# Patient Record
Sex: FEMALE | URBAN - METROPOLITAN AREA
[De-identification: names, ages, dates, MRNs, and addresses within clinical notes are randomized per-mention and may not be internally consistent; named-entity substitution may affect disease eponyms.]

---

## 2024-06-27 ENCOUNTER — OFFICE VISIT (OUTPATIENT)
Dept: URBAN - METROPOLITAN AREA CLINIC 94 | Facility: CLINIC | Age: 64
End: 2024-06-27

## 2024-06-28 ENCOUNTER — OFFICE VISIT (OUTPATIENT)
Dept: URBAN - METROPOLITAN AREA CLINIC 94 | Facility: CLINIC | Age: 64
End: 2024-06-28
Payer: MEDICARE

## 2024-06-28 ENCOUNTER — DASHBOARD ENCOUNTERS (OUTPATIENT)
Age: 64
End: 2024-06-28

## 2024-06-28 VITALS
HEIGHT: 62 IN | TEMPERATURE: 98.2 F | HEART RATE: 70 BPM | SYSTOLIC BLOOD PRESSURE: 117 MMHG | BODY MASS INDEX: 30.62 KG/M2 | DIASTOLIC BLOOD PRESSURE: 69 MMHG | OXYGEN SATURATION: 94 % | WEIGHT: 166.4 LBS

## 2024-06-28 DIAGNOSIS — R79.89 ELEVATED LFTS: ICD-10-CM

## 2024-06-28 DIAGNOSIS — Z85.028 HISTORY OF GASTRIC CANCER: ICD-10-CM

## 2024-06-28 DIAGNOSIS — R10.13 EPIGASTRIC PAIN: ICD-10-CM

## 2024-06-28 PROBLEM — 473061005: Status: ACTIVE | Noted: 2024-06-28

## 2024-06-28 PROCEDURE — 99204 OFFICE O/P NEW MOD 45 MIN: CPT

## 2024-06-28 RX ORDER — OMEPRAZOLE 40 MG/1
1 CAPSULE 30 MINUTES BEFORE MORNING MEAL CAPSULE, DELAYED RELEASE ORAL ONCE A DAY
Qty: 30 | Refills: 3 | OUTPATIENT
Start: 2024-06-28

## 2024-06-28 RX ORDER — ATORVASTATIN CALCIUM 20 MG/1
1 TABLET TABLET, FILM COATED ORAL ONCE A DAY
Status: ACTIVE | COMMUNITY

## 2024-06-28 RX ORDER — SERTRALINE 50 MG/1
1 TABLET TABLET, FILM COATED ORAL ONCE A DAY
Status: ACTIVE | COMMUNITY

## 2024-06-28 RX ORDER — AMLODIPINE BESYLATE 10 MG/1
1 TABLET TABLET ORAL ONCE A DAY
Status: ACTIVE | COMMUNITY

## 2024-06-28 NOTE — HPI-TODAY'S VISIT:
65 y/o F hx of gastric cancer? presents for NP eval of epigastric pain.  Pt admits to 3 days hx of sudden epigastric pain. She cannot ID any trigger and occurs regardless of PO intake. Pt states she has a hx of gastric cancer with surgery with [facility] in [place] and no recent EGD. Per record review, EGD done in 05/2023 with gastric ulcer, gastritis no other abnl. Denies heartburn, regurg, n/v, melena. Denies generalized abdominal pain, changes in bowel habits. No constipation, diarrhea. Denies rectal bleeding, dark stool, unintentional wt loss.   No heart, lung disease. No blood thinners. No DM meds.   05/2023 CLS: 5-7 mm ascending x 3 TA. 7 mm transverse TA. 10 mm distal rectal TV. Left diverticulosis . Repeat 3 y.  05/2023 EGD: Normal esophagus and duodenum. Chronic gastritis with ulcer.  No heart, lung disease. No blood thinners. No DM meds.

## 2024-07-25 ENCOUNTER — CLAIMS CREATED FROM THE CLAIM WINDOW (OUTPATIENT)
Dept: URBAN - METROPOLITAN AREA CLINIC 4 | Facility: CLINIC | Age: 64
End: 2024-07-25
Payer: COMMERCIAL

## 2024-07-25 ENCOUNTER — OFFICE VISIT (OUTPATIENT)
Dept: URBAN - METROPOLITAN AREA SURGERY CENTER 17 | Facility: SURGERY CENTER | Age: 64
End: 2024-07-25

## 2024-07-25 ENCOUNTER — CLAIMS CREATED FROM THE CLAIM WINDOW (OUTPATIENT)
Dept: URBAN - METROPOLITAN AREA SURGERY CENTER 17 | Facility: SURGERY CENTER | Age: 64
End: 2024-07-25
Payer: COMMERCIAL

## 2024-07-25 DIAGNOSIS — K21.9 GERD: ICD-10-CM

## 2024-07-25 DIAGNOSIS — K31.A0 GASTRIC INTESTINAL METAPLASIA, UNSPECIFIED: ICD-10-CM

## 2024-07-25 DIAGNOSIS — K31.A0 GASTRIC INTESTINAL METAPLASIA UNSPECIFIED: ICD-10-CM

## 2024-07-25 DIAGNOSIS — K21.9 GASTRO-ESOPHAGEAL REFLUX DISEASE WITHOUT ESOPHAGITIS: ICD-10-CM

## 2024-07-25 PROCEDURE — 00731 ANES UPR GI NDSC PX NOS: CPT | Performed by: NURSE ANESTHETIST, CERTIFIED REGISTERED

## 2024-07-25 PROCEDURE — 88342 IMHCHEM/IMCYTCHM 1ST ANTB: CPT | Performed by: PATHOLOGY

## 2024-07-25 PROCEDURE — 88305 TISSUE EXAM BY PATHOLOGIST: CPT | Performed by: PATHOLOGY

## 2024-07-25 RX ORDER — ATORVASTATIN CALCIUM 20 MG/1
1 TABLET TABLET, FILM COATED ORAL ONCE A DAY
Status: ACTIVE | COMMUNITY

## 2024-07-25 RX ORDER — SERTRALINE 50 MG/1
1 TABLET TABLET, FILM COATED ORAL ONCE A DAY
Status: ACTIVE | COMMUNITY

## 2024-07-25 RX ORDER — AMLODIPINE BESYLATE 10 MG/1
1 TABLET TABLET ORAL ONCE A DAY
Status: ACTIVE | COMMUNITY

## 2024-07-25 RX ORDER — OMEPRAZOLE 40 MG/1
1 CAPSULE 30 MINUTES BEFORE MORNING MEAL CAPSULE, DELAYED RELEASE ORAL ONCE A DAY
Qty: 30 | Refills: 3 | Status: ACTIVE | COMMUNITY
Start: 2024-06-28

## 2024-08-15 ENCOUNTER — LAB OUTSIDE AN ENCOUNTER (OUTPATIENT)
Dept: URBAN - METROPOLITAN AREA CLINIC 94 | Facility: CLINIC | Age: 64
End: 2024-08-15

## 2024-08-15 ENCOUNTER — OFFICE VISIT (OUTPATIENT)
Dept: URBAN - METROPOLITAN AREA CLINIC 94 | Facility: CLINIC | Age: 64
End: 2024-08-15
Payer: MEDICARE

## 2024-08-15 VITALS
HEIGHT: 62 IN | SYSTOLIC BLOOD PRESSURE: 128 MMHG | TEMPERATURE: 99.7 F | OXYGEN SATURATION: 95 % | BODY MASS INDEX: 30.18 KG/M2 | HEART RATE: 78 BPM | DIASTOLIC BLOOD PRESSURE: 78 MMHG | WEIGHT: 164 LBS

## 2024-08-15 DIAGNOSIS — K76.0 HEPATIC STEATOSIS: ICD-10-CM

## 2024-08-15 DIAGNOSIS — K29.50 CHRONIC GASTRITIS WITHOUT BLEEDING, UNSPECIFIED GASTRITIS TYPE: ICD-10-CM

## 2024-08-15 PROBLEM — 8493009: Status: ACTIVE | Noted: 2024-08-15

## 2024-08-15 PROBLEM — 197321007: Status: ACTIVE | Noted: 2024-08-15

## 2024-08-15 PROCEDURE — 99214 OFFICE O/P EST MOD 30 MIN: CPT

## 2024-08-15 RX ORDER — FAMOTIDINE 40 MG/1
1 TABLET AT BEDTIME TABLET, FILM COATED ORAL ONCE A DAY
Qty: 90 TABLET | Refills: 1 | OUTPATIENT
Start: 2024-08-15

## 2024-08-15 RX ORDER — ATORVASTATIN CALCIUM 20 MG/1
1 TABLET TABLET, FILM COATED ORAL ONCE A DAY
Status: ACTIVE | COMMUNITY

## 2024-08-15 RX ORDER — SUCRALFATE 1 G/1
1 TABLET ON AN EMPTY STOMACH TABLET ORAL
Qty: 90 | Refills: 1 | OUTPATIENT
Start: 2024-08-15 | End: 2024-10-15

## 2024-08-15 RX ORDER — SERTRALINE 50 MG/1
1 TABLET TABLET, FILM COATED ORAL ONCE A DAY
Status: ACTIVE | COMMUNITY

## 2024-08-15 RX ORDER — OMEPRAZOLE 40 MG/1
1 CAPSULE 30 MINUTES BEFORE MORNING MEAL CAPSULE, DELAYED RELEASE ORAL ONCE A DAY
Qty: 30 | Refills: 3 | Status: ACTIVE | COMMUNITY
Start: 2024-06-28

## 2024-08-15 RX ORDER — AMLODIPINE BESYLATE 10 MG/1
1 TABLET TABLET ORAL ONCE A DAY
Status: ACTIVE | COMMUNITY

## 2024-08-15 NOTE — HPI-TODAY'S VISIT:
63 y/o F hx of gastric cancer? presents for EGD f/u for epigastric pain.  Pt reports epigastric pain has improved somewhat since starting omeprazole. Does report it still occurs and cannot ID particular trigger. Does not assc it with PO intake but admits it will wax/wane t/o the day but is improved compared to prior. Denies reflux, regurg, n/v, dysphagia. Denies generalized abdominal pain, changes in bowel habits. No constipation, diarrhea. Denies rectal bleeding, dark stool, unintentional wt loss.   Does endorse ? hx of gastric cancer. Per record review with [facility] - no documentation but will request full records. Per record review, EGD done in 05/2023 with gastric ulcer, gastritis no other abnl.   10/2023 CT a/p: No acute intra-abdominal process. 1.1 cm polypoid bladder mass concerning for malignant neoplastic disease. Urologic consultation is recommended. 1.5 cm exophytic left renal lesion demonstrating T1 hyperintensity on prior abdominal MRI, likely representing a hemorrhagic cyst, has not slightly changed since prior examination.  Diverticulosis coli. No evidence of acute diverticulitis. Hepatic steatosis.  03/2024 US: Hepatic steatosis.  05/2024 CT a/p: Mild fatty liver. No other abnl.   05/2023 CLS: 5-7 mm ascending x 3 TA. 7 mm transverse TA. 10 mm distal rectal TV. Left diverticulosis . Repeat 3 y.  05/2023 EGD: Normal esophagus and duodenum. Chronic gastritis with ulcer. 07/2024 EGD: Chronic inactive gastritis with changes suggestive of treated H pylori gastritis. Neg dysplasia, malignancy. Reflux esophagitis. Neg EoE, BE.

## 2024-08-20 LAB
ALBUMIN/GLOBULIN RATIO: 1.5
ALBUMIN: 4.6
ALKALINE PHOSPHATASE: 125
ALT (SGPT): 68
AST (SGOT): 60
BILIRUBIN, DIRECT: 0.1
BILIRUBIN, INDIRECT: 0.5
BILIRUBIN, TOTAL: 0.6
GLOBULIN: 3
H PYLORI BREATH TEST: DETECTED
PROTEIN, TOTAL: 7.6

## 2024-08-21 ENCOUNTER — TELEPHONE ENCOUNTER (OUTPATIENT)
Dept: URBAN - METROPOLITAN AREA CLINIC 94 | Facility: CLINIC | Age: 64
End: 2024-08-21

## 2024-08-21 ENCOUNTER — LAB OUTSIDE AN ENCOUNTER (OUTPATIENT)
Dept: URBAN - METROPOLITAN AREA CLINIC 94 | Facility: CLINIC | Age: 64
End: 2024-08-21

## 2024-08-21 RX ORDER — CLARITHROMYCIN 500 MG/1
1 TABLET TABLET, FILM COATED ORAL
Qty: 28 TABLET | OUTPATIENT
Start: 2024-08-21 | End: 2024-09-04

## 2024-08-21 RX ORDER — OMEPRAZOLE 40 MG/1
1 CAPSULE 30 MINUTES BEFORE MEAL CAPSULE, DELAYED RELEASE ORAL TWICE DAILY
Qty: 28 | Refills: 0 | OUTPATIENT
Start: 2024-08-21

## 2024-08-21 RX ORDER — METRONIDAZOLE 500 MG/1
1 TABLET TABLET ORAL THREE TIMES A DAY
Qty: 42 TABLET | OUTPATIENT
Start: 2024-08-21 | End: 2024-09-04

## 2024-09-27 ENCOUNTER — OFFICE VISIT (OUTPATIENT)
Dept: URBAN - METROPOLITAN AREA CLINIC 94 | Facility: CLINIC | Age: 64
End: 2024-09-27
Payer: MEDICARE

## 2024-09-27 VITALS
TEMPERATURE: 98.2 F | DIASTOLIC BLOOD PRESSURE: 69 MMHG | WEIGHT: 164.6 LBS | OXYGEN SATURATION: 90 % | SYSTOLIC BLOOD PRESSURE: 122 MMHG | HEIGHT: 62 IN | BODY MASS INDEX: 30.29 KG/M2 | HEART RATE: 82 BPM

## 2024-09-27 DIAGNOSIS — A04.8 H. PYLORI INFECTION: ICD-10-CM

## 2024-09-27 DIAGNOSIS — K76.0 HEPATIC STEATOSIS: ICD-10-CM

## 2024-09-27 PROCEDURE — 99214 OFFICE O/P EST MOD 30 MIN: CPT

## 2024-09-27 RX ORDER — SUCRALFATE 1 G/1
1 TABLET ON AN EMPTY STOMACH TABLET ORAL
Qty: 90 | Refills: 1 | Status: ACTIVE | COMMUNITY
Start: 2024-08-15 | End: 2024-10-15

## 2024-09-27 RX ORDER — SERTRALINE 50 MG/1
1 TABLET TABLET, FILM COATED ORAL ONCE A DAY
Status: ACTIVE | COMMUNITY

## 2024-09-27 RX ORDER — OMEPRAZOLE 40 MG/1
1 CAPSULE 30 MINUTES BEFORE MEAL CAPSULE, DELAYED RELEASE ORAL TWICE DAILY
Qty: 28 | Refills: 0 | Status: ACTIVE | COMMUNITY
Start: 2024-08-21

## 2024-09-27 RX ORDER — FAMOTIDINE 40 MG/1
1 TABLET AT BEDTIME TABLET, FILM COATED ORAL ONCE A DAY
Qty: 90 TABLET | Refills: 1 | Status: ACTIVE | COMMUNITY
Start: 2024-08-15

## 2024-09-27 RX ORDER — ATORVASTATIN CALCIUM 20 MG/1
1 TABLET TABLET, FILM COATED ORAL ONCE A DAY
Status: ACTIVE | COMMUNITY

## 2024-09-27 RX ORDER — AMLODIPINE BESYLATE 10 MG/1
1 TABLET TABLET ORAL ONCE A DAY
Status: ACTIVE | COMMUNITY

## 2024-09-27 RX ORDER — OMEPRAZOLE 40 MG/1
1 CAPSULE 30 MINUTES BEFORE MORNING MEAL CAPSULE, DELAYED RELEASE ORAL ONCE A DAY
Qty: 30 | Refills: 3 | Status: ACTIVE | COMMUNITY
Start: 2024-06-28

## 2024-09-27 RX ORDER — FAMOTIDINE 40 MG/1
1 TABLET AT BEDTIME TABLET, FILM COATED ORAL ONCE A DAY
Qty: 90 TABLET | Refills: 3 | OUTPATIENT
Start: 2024-09-27

## 2024-09-27 NOTE — HPI-TODAY'S VISIT:
63 y/o F  presents for H pylori f/u.  Admits epigastric pain has improved but still endorses acid reflux. EGD 07/2024 with gastritis, H pylori f/u test positive. Has started abx and has few days left. Takes ibuprofen intermittently. Did recenlty get tramadol rx for pain and says this helps as well. Denies n/v, dysphagia, regurg. No lower abd pain, constipation, diarrhea.   Does endorse ? hx of gastric cancer. Per complete record review with prior GI - no evidence of gastric cancer.  10/2023 CT a/p: No acute intra-abdominal process. 1.1 cm polypoid bladder mass concerning for malignant neoplastic disease. Urologic consultation is recommended. 1.5 cm exophytic left renal lesion demonstrating T1 hyperintensity on prior abdominal MRI, likely representing a hemorrhagic cyst, has not slightly changed since prior examination.  Diverticulosis coli. No evidence of acute diverticulitis. Hepatic steatosis.  03/2024 US: Hepatic steatosis.  05/2024 CT a/p: Mild fatty liver. No other abnl.   05/2023 CLS: 5-7 mm ascending x 3 TA. 7 mm transverse TA. 10 mm distal rectal TV. Left diverticulosis . Repeat 3 y.  05/2023 EGD: Normal esophagus and duodenum. Chronic gastritis with ulcer. 07/2024 EGD: Chronic inactive gastritis with changes suggestive of treated H pylori gastritis. Neg dysplasia, malignancy. Reflux esophagitis. Neg EoE, BE.

## 2024-11-04 ENCOUNTER — OFFICE VISIT (OUTPATIENT)
Dept: URBAN - METROPOLITAN AREA CLINIC 94 | Facility: CLINIC | Age: 64
End: 2024-11-04
Payer: MEDICARE

## 2024-11-04 VITALS
HEART RATE: 76 BPM | HEIGHT: 62 IN | TEMPERATURE: 98.1 F | DIASTOLIC BLOOD PRESSURE: 67 MMHG | OXYGEN SATURATION: 93 % | WEIGHT: 160 LBS | BODY MASS INDEX: 29.44 KG/M2 | SYSTOLIC BLOOD PRESSURE: 111 MMHG

## 2024-11-04 DIAGNOSIS — K21.00 GASTROESOPHAGEAL REFLUX DISEASE WITH ESOPHAGITIS WITHOUT HEMORRHAGE: ICD-10-CM

## 2024-11-04 DIAGNOSIS — K76.0 HEPATIC STEATOSIS: ICD-10-CM

## 2024-11-04 DIAGNOSIS — A04.8 H. PYLORI INFECTION: ICD-10-CM

## 2024-11-04 PROBLEM — 266433003: Status: ACTIVE | Noted: 2024-11-04

## 2024-11-04 PROCEDURE — 99214 OFFICE O/P EST MOD 30 MIN: CPT

## 2024-11-04 RX ORDER — FAMOTIDINE 40 MG/1
1 TABLET AT BEDTIME TABLET, FILM COATED ORAL ONCE A DAY
Qty: 90 TABLET | Refills: 1 | Status: DISCONTINUED | COMMUNITY
Start: 2024-08-15

## 2024-11-04 RX ORDER — ALBUTEROL SULFATE 90 UG/1
AEROSOL, METERED RESPIRATORY (INHALATION)
Qty: 18 GRAM | Status: ACTIVE | COMMUNITY

## 2024-11-04 RX ORDER — OMEPRAZOLE 40 MG/1
1 CAPSULE 30 MINUTES BEFORE MORNING MEAL CAPSULE, DELAYED RELEASE ORAL ONCE A DAY
Qty: 30 | Refills: 3 | Status: ACTIVE | COMMUNITY
Start: 2024-06-28

## 2024-11-04 RX ORDER — FAMOTIDINE 40 MG/1
1 TABLET AT BEDTIME TABLET, FILM COATED ORAL ONCE A DAY
Qty: 90 TABLET | Refills: 3 | Status: DISCONTINUED | COMMUNITY
Start: 2024-09-27

## 2024-11-04 RX ORDER — SERTRALINE 50 MG/1
1 TABLET TABLET, FILM COATED ORAL ONCE A DAY
Status: ON HOLD | COMMUNITY

## 2024-11-04 RX ORDER — CLARITHROMYCIN 500 MG/1
TABLET, FILM COATED ORAL
Qty: 28 TABLET | Status: ACTIVE | COMMUNITY

## 2024-11-04 RX ORDER — IBUPROFEN 800 MG/1
TABLET ORAL
Qty: 21 TABLET | Status: ACTIVE | COMMUNITY

## 2024-11-04 RX ORDER — SUCRALFATE 1 G/1
TABLET ORAL
Qty: 90 TABLET | Status: ACTIVE | COMMUNITY

## 2024-11-04 RX ORDER — ATORVASTATIN CALCIUM 20 MG/1
1 TABLET TABLET, FILM COATED ORAL ONCE A DAY
Status: DISCONTINUED | COMMUNITY

## 2024-11-04 RX ORDER — FAMOTIDINE 40 MG/1
1 TABLET AT BEDTIME TABLET, FILM COATED ORAL ONCE A DAY
Qty: 90 TABLET | Refills: 1
Start: 2024-08-15

## 2024-11-04 RX ORDER — AMLODIPINE BESYLATE 10 MG/1
1 TABLET TABLET ORAL ONCE A DAY
Status: ACTIVE | COMMUNITY

## 2024-11-04 NOTE — HPI-TODAY'S VISIT:
63 y/o F  presents for H pylori f/u.  Admits epigastric pain, reflux have improved since finishing H pylori tx yesterday. Denies epigastric pain, reflux n/v, dysphagia, regurg. No lower abd pain, constipation, diarrhea.   Does endorse ? hx of gastric cancer. Per complete record review with prior GI - no evidence of gastric cancer.  10/2023 CT a/p: No acute intra-abdominal process. 1.1 cm polypoid bladder mass concerning for malignant neoplastic disease. Urologic consultation is recommended. 1.5 cm exophytic left renal lesion demonstrating T1 hyperintensity on prior abdominal MRI, likely representing a hemorrhagic cyst, has not slightly changed since prior examination.  Diverticulosis coli. No evidence of acute diverticulitis. Hepatic steatosis.  03/2024 US: Hepatic steatosis.  05/2024 CT a/p: Mild fatty liver. No other abnl.  10/2024 fibroscan: absent/minimal fibrosis   05/2023 CLS: 5-7 mm ascending x 3 TA. 7 mm transverse TA. 10 mm distal rectal TV. Left diverticulosis . Repeat 3 y.  05/2023 EGD: Normal esophagus and duodenum. Chronic gastritis with ulcer. 07/2024 EGD: Chronic inactive gastritis with changes suggestive of treated H pylori gastritis. Neg dysplasia, malignancy. Reflux esophagitis. Neg EoE, BE.

## 2024-12-09 ENCOUNTER — OFFICE VISIT (OUTPATIENT)
Dept: URBAN - METROPOLITAN AREA CLINIC 94 | Facility: CLINIC | Age: 64
End: 2024-12-09
Payer: MEDICARE

## 2024-12-09 VITALS
TEMPERATURE: 99.1 F | HEIGHT: 62 IN | OXYGEN SATURATION: 91 % | DIASTOLIC BLOOD PRESSURE: 71 MMHG | HEART RATE: 85 BPM | WEIGHT: 160 LBS | BODY MASS INDEX: 29.44 KG/M2 | SYSTOLIC BLOOD PRESSURE: 122 MMHG

## 2024-12-09 DIAGNOSIS — K21.00 GASTROESOPHAGEAL REFLUX DISEASE WITH ESOPHAGITIS WITHOUT HEMORRHAGE: ICD-10-CM

## 2024-12-09 DIAGNOSIS — K76.0 HEPATIC STEATOSIS: ICD-10-CM

## 2024-12-09 DIAGNOSIS — A04.8 H. PYLORI INFECTION: ICD-10-CM

## 2024-12-09 DIAGNOSIS — R10.84 GENERALIZED ABDOMINAL PAIN: ICD-10-CM

## 2024-12-09 PROCEDURE — 99214 OFFICE O/P EST MOD 30 MIN: CPT

## 2024-12-09 RX ORDER — IBUPROFEN 800 MG/1
TABLET ORAL
Qty: 21 TABLET | Status: ACTIVE | COMMUNITY

## 2024-12-09 RX ORDER — OMEPRAZOLE 40 MG/1
1 CAPSULE 30 MINUTES BEFORE MORNING MEAL CAPSULE, DELAYED RELEASE ORAL ONCE A DAY
Qty: 30 | Refills: 3 | Status: ON HOLD | COMMUNITY
Start: 2024-06-28

## 2024-12-09 RX ORDER — DICYCLOMINE HYDROCHLORIDE 20 MG/1
1 TABLET TABLET ORAL
Qty: 90 | Refills: 1 | OUTPATIENT
Start: 2024-12-09 | End: 2025-02-07

## 2024-12-09 RX ORDER — SUCRALFATE 1 G/1
TABLET ORAL
Qty: 90 TABLET | Status: ON HOLD | COMMUNITY

## 2024-12-09 RX ORDER — ALBUTEROL SULFATE 90 UG/1
AEROSOL, METERED RESPIRATORY (INHALATION)
Qty: 18 GRAM | Status: ACTIVE | COMMUNITY

## 2024-12-09 RX ORDER — SERTRALINE 50 MG/1
1 TABLET TABLET, FILM COATED ORAL ONCE A DAY
Status: ACTIVE | COMMUNITY

## 2024-12-09 RX ORDER — AMLODIPINE BESYLATE 10 MG/1
1 TABLET TABLET ORAL ONCE A DAY
Status: ACTIVE | COMMUNITY

## 2024-12-09 NOTE — HPI-TODAY'S VISIT:
63 y/o F  presents for H pylori f/u for retest.   Reports generalized abd pain and abd bloating. This has been unchanged over last several months. Cannot ID any triggers/alleviating factors. She denies epigastric pain, n/v, dysphagia, reflux, or regurg. No lower abd pain, diarrhea, constipation, blood or mucus in stool. She denies any PO triggers and really cannot identify any triggers for pain. Also endorses some abd bloating that is present when she wakes up as well.   Does endorse ? hx of gastric cancer. Per complete record review with prior GI - no evidence of gastric cancer.  10/2023 CT a/p: No acute intra-abdominal process. 1.1 cm polypoid bladder mass concerning for malignant neoplastic disease. Urologic consultation is recommended. 1.5 cm exophytic left renal lesion demonstrating T1 hyperintensity on prior abdominal MRI, likely representing a hemorrhagic cyst, has not slightly changed since prior examination.  Diverticulosis coli. No evidence of acute diverticulitis. Hepatic steatosis.  03/2024 US: Hepatic steatosis.  05/2024 CT a/p: Mild fatty liver. No other abnl.  10/2024 fibroscan: absent/minimal fibrosis   05/2023 CLS: 5-7 mm ascending x 3 TA. 7 mm transverse TA. 10 mm distal rectal TV. Left diverticulosis . Repeat 3 y.  05/2023 EGD: Normal esophagus and duodenum. Chronic gastritis with ulcer. 07/2024 EGD: Chronic inactive gastritis with changes suggestive of treated H pylori gastritis. Neg dysplasia, malignancy. Reflux esophagitis. Neg EoE, BE.

## 2025-03-10 ENCOUNTER — OFFICE VISIT (OUTPATIENT)
Dept: URBAN - METROPOLITAN AREA CLINIC 94 | Facility: CLINIC | Age: 65
End: 2025-03-10

## 2025-03-13 ENCOUNTER — OFFICE VISIT (OUTPATIENT)
Dept: URBAN - METROPOLITAN AREA CLINIC 94 | Facility: CLINIC | Age: 65
End: 2025-03-13

## 2025-03-13 VITALS — HEIGHT: 62 IN

## 2025-03-13 RX ORDER — SUCRALFATE 1 G/1
TABLET ORAL
Qty: 90 TABLET | COMMUNITY

## 2025-03-13 RX ORDER — ALBUTEROL SULFATE 90 UG/1
AEROSOL, METERED RESPIRATORY (INHALATION)
Qty: 18 GRAM | COMMUNITY

## 2025-03-13 RX ORDER — OMEPRAZOLE 40 MG/1
1 CAPSULE 30 MINUTES BEFORE MORNING MEAL CAPSULE, DELAYED RELEASE ORAL ONCE A DAY
Qty: 30 | Refills: 3 | COMMUNITY
Start: 2024-06-28

## 2025-03-13 RX ORDER — AMLODIPINE BESYLATE 10 MG/1
1 TABLET TABLET ORAL ONCE A DAY
COMMUNITY

## 2025-03-13 RX ORDER — IBUPROFEN 800 MG/1
TABLET ORAL
Qty: 21 TABLET | COMMUNITY

## 2025-03-13 RX ORDER — SERTRALINE 50 MG/1
1 TABLET TABLET, FILM COATED ORAL ONCE A DAY
COMMUNITY

## 2025-04-07 ENCOUNTER — OFFICE VISIT (OUTPATIENT)
Dept: URBAN - METROPOLITAN AREA CLINIC 94 | Facility: CLINIC | Age: 65
End: 2025-04-07
Payer: COMMERCIAL

## 2025-04-07 DIAGNOSIS — R10.13 EPIGASTRIC PAIN: ICD-10-CM

## 2025-04-07 DIAGNOSIS — K76.0 HEPATIC STEATOSIS: ICD-10-CM

## 2025-04-07 DIAGNOSIS — K21.00 GASTROESOPHAGEAL REFLUX DISEASE WITH ESOPHAGITIS WITHOUT HEMORRHAGE: ICD-10-CM

## 2025-04-07 PROCEDURE — 99214 OFFICE O/P EST MOD 30 MIN: CPT

## 2025-04-07 RX ORDER — IBUPROFEN 800 MG/1
TABLET ORAL
Qty: 21 TABLET | COMMUNITY

## 2025-04-07 RX ORDER — OMEPRAZOLE 40 MG/1
1 CAPSULE 30 MINUTES BEFORE MORNING MEAL CAPSULE, DELAYED RELEASE ORAL ONCE A DAY
Qty: 30 | Refills: 3 | Status: ACTIVE | COMMUNITY
Start: 2024-06-28

## 2025-04-07 RX ORDER — AMLODIPINE BESYLATE 10 MG/1
1 TABLET TABLET ORAL ONCE A DAY
Status: ACTIVE | COMMUNITY

## 2025-04-07 RX ORDER — SUCRALFATE 1 G/1
TABLET ORAL
Qty: 90 TABLET | Status: ACTIVE | COMMUNITY

## 2025-04-07 RX ORDER — SERTRALINE 50 MG/1
1 TABLET TABLET, FILM COATED ORAL ONCE A DAY
Status: ACTIVE | COMMUNITY

## 2025-04-07 RX ORDER — ALBUTEROL SULFATE 90 UG/1
AEROSOL, METERED RESPIRATORY (INHALATION)
Qty: 18 GRAM | Status: ACTIVE | COMMUNITY

## 2025-04-07 RX ORDER — ESOMEPRAZOLE MAGNESIUM 40 MG/1
1 CAPSULE CAPSULE, DELAYED RELEASE PELLETS ORAL ONCE A DAY
Qty: 90 CAPSULE | Refills: 1 | OUTPATIENT
Start: 2025-04-07

## 2025-04-07 RX ORDER — SUCRALFATE 1 G/1
1 TABLET ON AN EMPTY STOMACH TABLET ORAL
Qty: 270 TABLET | Refills: 1 | OUTPATIENT
Start: 2025-04-07

## 2025-04-07 NOTE — HPI-TODAY'S VISIT:
65 y/o F  presents for H pylori f/u for retest.   No significant changes in her sx. She still endorses abd pain now described as epigastric pain. She cannot ID any triggers for this pain - no assc with PO intake. Seldom intermittent nausea without vomiting. No heartburn, dysphagia, melena, regurg. Denies generalized abdominal pain, changes in bowel habits. No constipation, diarrhea. Denies rectal bleeding, dark stool, unintentional wt loss. She does report the sucralfate and omeprazole seem to help. She denies ETOH or ibuprofen use    LAST OV Reports generalized abd pain and abd bloating. This has been unchanged over last several months. Cannot ID any triggers/alleviating factors. She denies epigastric pain, n/v, dysphagia, reflux, or regurg. No lower abd pain, diarrhea, constipation, blood or mucus in stool. She denies any PO triggers and really cannot identify any triggers for pain. Also endorses some abd bloating that is present when she wakes up as well.   Does endorse ? hx of gastric cancer. Per complete record review with prior GI - no evidence of gastric cancer.  10/2023 CT a/p: No acute intra-abdominal process. 1.1 cm polypoid bladder mass concerning for malignant neoplastic disease. Urologic consultation is recommended. 1.5 cm exophytic left renal lesion demonstrating T1 hyperintensity on prior abdominal MRI, likely representing a hemorrhagic cyst, has not slightly changed since prior examination.  Diverticulosis coli. No evidence of acute diverticulitis. Hepatic steatosis.  03/2024 US: Hepatic steatosis.  05/2024 CT a/p: Mild fatty liver. No other abnl.  10/2024 fibroscan: absent/minimal fibrosis   05/2023 CLS: 5-7 mm ascending x 3 TA. 7 mm transverse TA. 10 mm distal rectal TV. Left diverticulosis . Repeat 3 y.  05/2023 EGD: Normal esophagus and duodenum. Chronic gastritis with ulcer. 07/2024 EGD: Chronic inactive gastritis with changes suggestive of treated H pylori gastritis. Neg dysplasia, malignancy. Reflux esophagitis. Neg EoE, BE.

## 2025-05-19 ENCOUNTER — OFFICE VISIT (OUTPATIENT)
Dept: URBAN - METROPOLITAN AREA CLINIC 94 | Facility: CLINIC | Age: 65
End: 2025-05-19